# Patient Record
Sex: MALE | Race: BLACK OR AFRICAN AMERICAN | NOT HISPANIC OR LATINO | Employment: STUDENT | URBAN - METROPOLITAN AREA
[De-identification: names, ages, dates, MRNs, and addresses within clinical notes are randomized per-mention and may not be internally consistent; named-entity substitution may affect disease eponyms.]

---

## 2024-02-28 ENCOUNTER — TELEPHONE (OUTPATIENT)
Dept: FAMILY MEDICINE CLINIC | Facility: CLINIC | Age: 24
End: 2024-02-28
Payer: COMMERCIAL

## 2024-02-28 NOTE — TELEPHONE ENCOUNTER
Caller: Jason Gonzalez    Relationship: Self    Best call back number: [phone]    What form or medical record are you requesting: A LETTER STATING THE PATIENT HS NEVER HAD A STOMACH ULCER     Who is requesting this form or medical record from you: SELF    How would you like to receive the form or medical records (pick-up, mail, fax): FAX  If fax, what is the fax number: [phone]    Timeframe paperwork needed: AS SOON AS POSSIBLE    Additional notes:ATTENTION SHELBY SUAREZ.

## 2024-02-29 ENCOUNTER — TELEPHONE (OUTPATIENT)
Dept: FAMILY MEDICINE CLINIC | Facility: CLINIC | Age: 24
End: 2024-02-29

## 2024-02-29 NOTE — TELEPHONE ENCOUNTER
Dr. Ruffin said he needs appt, made tmr with Dr. Brown at 9:30   No significant past surgical history

## 2024-02-29 NOTE — TELEPHONE ENCOUNTER
Last seen date:  PE on 7/6/22  Pt called and needs a letter stating he doesn't have a stomach ulcer.  He's going into the [employer] and he said he has to have this in order to enlist.

## 2024-07-18 ENCOUNTER — OFFICE VISIT (OUTPATIENT)
Dept: FAMILY MEDICINE CLINIC | Facility: CLINIC | Age: 24
End: 2024-07-18
Payer: COMMERCIAL

## 2024-07-18 VITALS
OXYGEN SATURATION: 100 % | HEART RATE: 51 BPM | WEIGHT: 193.8 LBS | BODY MASS INDEX: 25.69 KG/M2 | SYSTOLIC BLOOD PRESSURE: 127 MMHG | HEIGHT: 73 IN | DIASTOLIC BLOOD PRESSURE: 74 MMHG

## 2024-07-18 DIAGNOSIS — B07.9 VERRUCA VULGARIS: Primary | ICD-10-CM

## 2024-07-18 PROCEDURE — 99203 OFFICE O/P NEW LOW 30 MIN: CPT | Performed by: FAMILY MEDICINE

## 2024-07-18 NOTE — PROGRESS NOTES
"Chief Complaint  Callouses (Thumb, tender, 4 weeks )    Subjective    History of Present Illness {CC  Problem List  Visit  Diagnosis   Encounters  Notes  Medications  Labs  Result Review Imaging  Media :23}     Jason Gonzalez presents to [facility] for Callouses (Thumb, tender, 4 weeks ).  History of Present Illness     He presents with a 1 month history of 2 hyperkeratotic, wartlike lesions on his right thumb.  They are increasing in size and mildly tender.    Objective     Vital Signs:   /74   Pulse 51   Ht 185.4 cm (73\")   Wt 87.9 kg (193 lb 12.8 oz)   SpO2 100%   BMI 25.57 kg/m²   Body mass index is 25.57 kg/m².     Physical Exam  Constitutional:       General: He is not in acute distress.     Appearance: Normal appearance.   Skin:     Comments: Right thumb with hyperkeratotic lesion on both the dorsal and more surface.          Result Review  Data Reviewed:{ Labs  Result Review  Imaging  Med Tab  Media :23}                Assessment and Plan {CC Problem List  Visit Diagnosis  ROS  Review (Popup)  Health Maintenance  Quality  BestPractice  Medications  SmartSets  SnapShot Encounters  Media :23}   Diagnoses and all orders for this visit:    1. Verruca vulgaris (Primary)        Patient Instructions   I recommend soaking in warm water for 3 to 5 minutes followed by using a pumice stone to remove dead skin.  Then apply a small amount of salicylic acid directly to the lesions and cover with an occlusive tape.  Repeat this process daily and the warts that should gradually improve and be gone between 4 to 6 weeks.  If not improving, I recommend seeing a dermatologist for topical treatment.  Physical is due at any time.  I encourage you to return at your earliest convenience.     Patient was given instructions and counseling regarding his condition or for health maintenance advice on the AVS.       Return in about 5 months (around 12/18/2024) for Annual " physical.    Marcela Ruffin MD

## 2024-07-18 NOTE — PATIENT INSTRUCTIONS
I recommend soaking in warm water for 3 to 5 minutes followed by using a pumice stone to remove dead skin.  Then apply a small amount of salicylic acid directly to the lesions and cover with an occlusive tape.  Repeat this process daily and the warts that should gradually improve and be gone between 4 to 6 weeks.  If not improving, I recommend seeing a dermatologist for topical treatment.  Physical is due at any time.  I encourage you to return at your earliest convenience.

## 2025-01-22 ENCOUNTER — OFFICE VISIT (OUTPATIENT)
Dept: FAMILY MEDICINE CLINIC | Facility: CLINIC | Age: 25
End: 2025-01-22
Payer: COMMERCIAL

## 2025-01-22 VITALS
OXYGEN SATURATION: 97 % | WEIGHT: 191.7 LBS | HEART RATE: 50 BPM | BODY MASS INDEX: 25.41 KG/M2 | HEIGHT: 73 IN | DIASTOLIC BLOOD PRESSURE: 82 MMHG | SYSTOLIC BLOOD PRESSURE: 118 MMHG

## 2025-01-22 DIAGNOSIS — Z87.19 HISTORY OF HIATAL HERNIA: Primary | ICD-10-CM

## 2025-01-22 DIAGNOSIS — Z87.898 HISTORY OF EPIGASTRIC PAIN: ICD-10-CM

## 2025-01-22 PROCEDURE — 99214 OFFICE O/P EST MOD 30 MIN: CPT | Performed by: FAMILY MEDICINE

## 2025-01-22 NOTE — PROGRESS NOTES
"Chief Complaint  Medical Clearance    Subjective    History of Present Illness {  Problem List  Visit  Diagnosis   Encounters  Notes  Medications  Labs  Result Review Imaging  Media :23}     Jason Gonzalez presents to [facility] for Medical Clearance.  History of Present Illness     He is currently in [employer] school at [employer]. He is in process of joining [employer] and during that process, there was documentation noted of prior history of ulcer, however patient has never been diagnosed with ulcer. He has not seen GI or had prior endoscopy. He did have CT in the ER that showed small hiatal hernia.    On 6/24/2022, he was seen in the ER with vomiting and abdominal pain. He was given ondansetron, reglan, and omeprazole. He was seen by me in follow up on 7/6/2022. The reglan was stopped due to side effects and omeprazole was increased from 20-40 mg daily. He was referred to GI, but reports that he never went because his symptoms resolved. He did not continue the omeprazole after symptoms resolved.     In 6/2023, he was also seen in the ER with episode of vomiting that he recalls was related to food poisoning and in ER was given pantoprazole and zofran. His symptoms resolved and he had no further follow up and has had no further abdominal pain or bowel changes since 6/2023.       Objective     Vital Signs:   /82   Pulse 50   Ht 185.4 cm (73\")   Wt 87 kg (191 lb 11.2 oz)   SpO2 97%   BMI 25.29 kg/m²   Body mass index is 25.29 kg/m².     Physical Exam  Constitutional:       General: He is not in acute distress.  Cardiovascular:      Rate and Rhythm: Normal rate and regular rhythm.      Heart sounds: No murmur heard.  Pulmonary:      Effort: No respiratory distress.      Breath sounds: Normal breath sounds.   Abdominal:      General: There is no distension.      Palpations: Abdomen is soft.      Tenderness: There is no abdominal tenderness.   Neurological:      General: No focal " deficit present.      Mental Status: He is alert.          Result Review  Data Reviewed:{ Labs  Result Review  Imaging  Med Tab  Media :23}                Assessment and Plan {CC Problem List  Visit Diagnosis  ROS  Review (Popup)  Health Maintenance  Quality  BestPractice  Medications  SmartSets  SnapShot Encounters  Media :23}   Diagnoses and all orders for this visit:    1. History of hiatal hernia (Primary)    2. History of epigastric pain        Patient Instructions   Records reviewed in detail and there is no prior GI evaluation or endoscopy.  Given that you have not had abdominal symptoms in more than 1.5 years, no additional follow up is recommended at this time.     Patient was given instructions and counseling regarding his condition or for health maintenance advice on the AVS.     I spent 33 minutes on today's visit. This includes time reviewing the patient chart, hospital records, past medical history, and discussing treatment options.      No follow-ups on file.    Marcela Ruffin MD

## 2025-01-22 NOTE — PATIENT INSTRUCTIONS
Records reviewed in detail and there is no prior GI evaluation or endoscopy.  Given that you have not had abdominal symptoms in more than 1.5 years, no additional follow up is recommended at this time.

## 2025-02-06 ENCOUNTER — TELEPHONE (OUTPATIENT)
Dept: FAMILY MEDICINE CLINIC | Facility: CLINIC | Age: 25
End: 2025-02-06
Payer: COMMERCIAL

## 2025-02-06 NOTE — TELEPHONE ENCOUNTER
Can we call and see what he needs. Message is unclear but I believe he may be requesting consult with GI for endoscopy to rule out ulcer.

## 2025-02-06 NOTE — TELEPHONE ENCOUNTER
Caller: Jason Gonzalez    Relationship: Self    Best call back number: [phone]       Who are you requesting to speak with (clinical staff, provider,  specific staff member): DR BOND      What was the call regarding: PATIENT STATES HE WAS ADVISED THAT HE DOES NOT HAVE ULCER     PATIENT STATES HE NEEDS CONSULTATION AFTER WHAT HE NEEDS TO DO NEXT    Is it okay if the provider responds through MyChart: NO

## 2025-02-07 NOTE — TELEPHONE ENCOUNTER
Pt is wanting referral with GI just to get a letter saying that he does not have an ulcer. Please place referral

## 2025-02-08 DIAGNOSIS — Z87.19 HISTORY OF HIATAL HERNIA: Primary | ICD-10-CM

## 2025-02-08 DIAGNOSIS — Z87.898 HISTORY OF EPIGASTRIC PAIN: ICD-10-CM

## 2025-02-19 ENCOUNTER — OFFICE VISIT (OUTPATIENT)
Dept: GASTROENTEROLOGY | Facility: CLINIC | Age: 25
End: 2025-02-19
Payer: COMMERCIAL

## 2025-02-19 VITALS
SYSTOLIC BLOOD PRESSURE: 116 MMHG | HEART RATE: 43 BPM | OXYGEN SATURATION: 99 % | HEIGHT: 73 IN | WEIGHT: 197.6 LBS | TEMPERATURE: 97.7 F | DIASTOLIC BLOOD PRESSURE: 80 MMHG | BODY MASS INDEX: 26.19 KG/M2

## 2025-02-19 DIAGNOSIS — Z87.898 HISTORY OF VOMITING: Primary | ICD-10-CM

## 2025-02-19 PROCEDURE — 99202 OFFICE O/P NEW SF 15 MIN: CPT | Performed by: NURSE PRACTITIONER

## 2025-02-19 NOTE — PROGRESS NOTES
"Chief Complaint   Patient presents with    GI Problem         History of Present Illness  Patient is a 24-year-old male who presents today for evaluation. He has a history of hiatal hernia.    Patient presents today for evaluation.  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  He is attending law school at .    He reports when he was in college he had 2 ER visits for vomiting.  Vomiting occurred each time after drinking alcohol.  Outside of these episodes he has felt well from a GI standpoint he has had no GI issues since that time.    ER record reported history of peptic ulcer disease however patient reports he has never been diagnosed with peptic ulcer disease and never had an EGD.    He denies any heartburn, reflux, nausea, or vomiting.  Denies any abdominal pain.  Denies any bowel complaints.  Denies any blood in the stool.    No current or ongoing GI symptoms and reports no history of any diagnosed GI disorders.    He has no family history of any GI disorders.    He does not use NSAIDs on a regular basis.    He has had no prior abdominal surgeries.     Result Review :       CT ABDOMEN PELVIS WO CONTRAST (06/24/2022 11:05)    Office Visit with Marcela Ruffin MD (01/22/2025)    Vital Signs:   /80   Pulse (!) 43   Temp 97.7 °F (36.5 °C)   Ht 185.4 cm (73\")   Wt 89.6 kg (197 lb 9.6 oz)   SpO2 99%   BMI 26.07 kg/m²     Body mass index is 26.07 kg/m².     Physical Exam  Vitals reviewed.   Constitutional:       General: He is not in acute distress.     Appearance: He is well-developed.   HENT:      Head: Normocephalic and atraumatic.   Pulmonary:      Effort: Pulmonary effort is normal. No respiratory distress.   Abdominal:      General: Abdomen is flat. Bowel sounds are normal. There is no distension.      Palpations: Abdomen is soft.      Tenderness: There is no abdominal tenderness.   Skin:     General: Skin is dry.    "   Coloration: Skin is not pale.   Neurological:      Mental Status: He is alert and oriented to person, place, and time.   Psychiatric:         Thought Content: Thought content normal.           Assessment and Plan    Diagnoses and all orders for this visit:    1. History of vomiting (Primary)         Discussion  Patient presents today for evaluation.  He has had ER visits in the past for acute vomiting.  No current GI symptoms.  He reports no history of peptic ulcer disease and that he has had no prior endoscopic evaluation completed.  His symptoms were acute and fully resolved.    In the office today he is asymptomatic.  He has no symptoms to suggest peptic ulcer disease or any GI pathology.  Discussed with him today no further testing or treatment recommended at this time.  Will provide letter stating this and he may follow-up with our office on an as-needed basis for any new or worsening symptoms.    If further workup is needed as part of medical clearance, we discussed could consider EGD or upper GI x-ray however feel this is unnecessary as he is asymptomatic and has no symptoms that would suggest peptic ulcer disease or any additional GI pathology.          Follow Up   Return if symptoms worsen or fail to improve.    There are no Patient Instructions on file for this visit.